# Patient Record
Sex: FEMALE | Race: WHITE | NOT HISPANIC OR LATINO | Employment: FULL TIME | URBAN - METROPOLITAN AREA
[De-identification: names, ages, dates, MRNs, and addresses within clinical notes are randomized per-mention and may not be internally consistent; named-entity substitution may affect disease eponyms.]

---

## 2022-05-03 ENCOUNTER — OFFICE VISIT (OUTPATIENT)
Dept: GASTROENTEROLOGY | Facility: CLINIC | Age: 37
End: 2022-05-03
Payer: COMMERCIAL

## 2022-05-03 ENCOUNTER — TELEPHONE (OUTPATIENT)
Dept: GASTROENTEROLOGY | Facility: CLINIC | Age: 37
End: 2022-05-03
Payer: COMMERCIAL

## 2022-05-03 ENCOUNTER — LAB VISIT (OUTPATIENT)
Dept: LAB | Facility: HOSPITAL | Age: 37
End: 2022-05-03
Attending: INTERNAL MEDICINE
Payer: COMMERCIAL

## 2022-05-03 VITALS
HEART RATE: 74 BPM | DIASTOLIC BLOOD PRESSURE: 82 MMHG | WEIGHT: 191.13 LBS | BODY MASS INDEX: 30 KG/M2 | SYSTOLIC BLOOD PRESSURE: 118 MMHG | HEIGHT: 67 IN

## 2022-05-03 DIAGNOSIS — K76.0 HEPATIC STEATOSIS: ICD-10-CM

## 2022-05-03 DIAGNOSIS — R10.2 PELVIC PAIN: ICD-10-CM

## 2022-05-03 LAB
ALBUMIN SERPL BCP-MCNC: 4.1 G/DL (ref 3.5–5.2)
ALP SERPL-CCNC: 53 U/L (ref 55–135)
ALT SERPL W/O P-5'-P-CCNC: 17 U/L (ref 10–44)
ANION GAP SERPL CALC-SCNC: 14 MMOL/L (ref 8–16)
AST SERPL-CCNC: 17 U/L (ref 10–40)
BILIRUB SERPL-MCNC: 0.4 MG/DL (ref 0.1–1)
BUN SERPL-MCNC: 14 MG/DL (ref 6–20)
CALCIUM SERPL-MCNC: 9.8 MG/DL (ref 8.7–10.5)
CHLORIDE SERPL-SCNC: 105 MMOL/L (ref 95–110)
CHOLEST SERPL-MCNC: 219 MG/DL (ref 120–199)
CHOLEST/HDLC SERPL: 4.6 {RATIO} (ref 2–5)
CO2 SERPL-SCNC: 19 MMOL/L (ref 23–29)
CREAT SERPL-MCNC: 0.6 MG/DL (ref 0.5–1.4)
EST. GFR  (AFRICAN AMERICAN): >60 ML/MIN/1.73 M^2
EST. GFR  (NON AFRICAN AMERICAN): >60 ML/MIN/1.73 M^2
GLUCOSE SERPL-MCNC: 79 MG/DL (ref 70–110)
HDLC SERPL-MCNC: 48 MG/DL (ref 40–75)
HDLC SERPL: 21.9 % (ref 20–50)
LDLC SERPL CALC-MCNC: 143.4 MG/DL (ref 63–159)
NONHDLC SERPL-MCNC: 171 MG/DL
POTASSIUM SERPL-SCNC: 4.2 MMOL/L (ref 3.5–5.1)
PROT SERPL-MCNC: 7.5 G/DL (ref 6–8.4)
SODIUM SERPL-SCNC: 138 MMOL/L (ref 136–145)
TRIGL SERPL-MCNC: 138 MG/DL (ref 30–150)

## 2022-05-03 PROCEDURE — 83036 HEMOGLOBIN GLYCOSYLATED A1C: CPT | Performed by: INTERNAL MEDICINE

## 2022-05-03 PROCEDURE — 36415 COLL VENOUS BLD VENIPUNCTURE: CPT | Mod: PO | Performed by: INTERNAL MEDICINE

## 2022-05-03 PROCEDURE — 99204 PR OFFICE/OUTPT VISIT, NEW, LEVL IV, 45-59 MIN: ICD-10-PCS | Mod: S$GLB,,, | Performed by: INTERNAL MEDICINE

## 2022-05-03 PROCEDURE — 80061 LIPID PANEL: CPT | Performed by: INTERNAL MEDICINE

## 2022-05-03 PROCEDURE — 99999 PR PBB SHADOW E&M-EST. PATIENT-LVL IV: ICD-10-PCS | Mod: PBBFAC,,, | Performed by: INTERNAL MEDICINE

## 2022-05-03 PROCEDURE — 80053 COMPREHEN METABOLIC PANEL: CPT | Performed by: INTERNAL MEDICINE

## 2022-05-03 PROCEDURE — 99999 PR PBB SHADOW E&M-EST. PATIENT-LVL IV: CPT | Mod: PBBFAC,,, | Performed by: INTERNAL MEDICINE

## 2022-05-03 PROCEDURE — 99204 OFFICE O/P NEW MOD 45 MIN: CPT | Mod: S$GLB,,, | Performed by: INTERNAL MEDICINE

## 2022-05-03 RX ORDER — PROPRANOLOL HYDROCHLORIDE 1 MG/ML
INJECTION INTRAVENOUS
COMMUNITY
End: 2022-05-18

## 2022-05-03 NOTE — ASSESSMENT & PLAN NOTE
-Patient counseled on weight loss   -CMP ordered   -HA1C and lipid panel ordered  -Requested US abdomen report

## 2022-05-03 NOTE — PROGRESS NOTES
Addendum:   Pelvic US: No acute findings   Abdominal US: Hepatic steatosis          Clinic Consult:  [facility] Consultation Note    Reason for Consult:  Diagnoses of Hepatic steatosis and Pelvic pain were pertinent to this visit.    PCP: Primary Doctor No   [address] / [place] [id number]    HPI:  This is a 37 y.o. female here for evaluation of abdominal pain.   Pain is LLQ and RLQ.   She has a history of ovarian cyst and retroperitoneal cyst. She had two surgeries for these cysts.   She is followed by ob-gyn.   She had an US abdomen/pelvis that revealed hepatic steatosis.  She was referred to GI for hepatic steatosis.     ROS:  Review of Systems   Genitourinary: Negative for pelvic pain.           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 6/21 CKC CIN2 (peripheral edges & deep margin neg)/-ECC; 1/22 nL/-HPV    Insulin resistance     no metformin in months prior to conception       Surgical History:  Past Surgical History:   Procedure Laterality Date    CERVICAL BIOPSY  W/ LOOP ELECTRODE EXCISION      CKC  06/25/2021    DILATION AND CURETTAGE OF UTERUS      EXPLORATORY LAPAROTOMY      x2; 7/18 during preg, ex lap w/removal of right retroperitoneal cyst, [facility] primary, Gen sx consulted intraop, path- 9cm mucinous cystadenoma. 3/21 ex lap by gen sx Dr. Diaz at [facility], gyn Dr. Pimentel consulted intraop-->LSO, path- 12.5cm mucinous cystadenoma    SALPINGOOPHORECTOMY  03/2021    ex-lap/LSO; gen sx primary Dr. Diaz at [facility], gyn Dr. Pimentel consulted intraop-->LSO, path- 12.5cm mucinous cystadenoma       Family History:   Family History   Problem Relation Age of  "Onset    Lung cancer Maternal Grandfather        Social History:   Social History     Tobacco Use    Smoking status: Never Smoker    Smokeless tobacco: Never Used   Substance Use Topics    Alcohol use: Yes    Drug use: Never       Allergies: Reviewed    Home Medications:   Medication List with Changes/Refills   Current Medications    AUROVELA FE 1-20, 28, 1 MG-20 MCG (21)/75 MG (7) PER TABLET    Take 1 tablet by mouth once daily.    CITALOPRAM HYDROBROMIDE (CITALOPRAM ORAL)    citalopram Take No date recorded No form recorded No frequency recorded No route recorded No set duration recorded No set duration amount recorded suspended No dosage strength recorded No dosage strength units of measure recorded    METRONIDAZOLE (FLAGYL) 500 MG TABLET    Take 1 tablet (500 mg total) by mouth every 12 (twelve) hours. for 7 days    PROPRANOLOL (INDERAL) 1 MG/ML INJECTION    propranolol Take No date recorded No form recorded No frequency recorded No route recorded No set duration recorded No set duration amount recorded suspended No dosage strength recorded No dosage strength units of measure recorded   Discontinued Medications    SERTRALINE (ZOLOFT) 50 MG TABLET    Take 50 mg by mouth once daily.         Physical Exam:  Vital Signs:  /82 (BP Location: Right arm, Patient Position: Sitting, BP Method: Medium (Manual))   Pulse 74   Ht 5' 7" (1.702 m)   Wt 86.7 kg (191 lb 2.2 oz)   LMP 04/17/2022   BMI 29.94 kg/m²   Body mass index is 29.94 kg/m².    Physical Exam  Constitutional:       Appearance: Normal appearance.   Eyes:      Conjunctiva/sclera: Conjunctivae normal.   Pulmonary:      Effort: Pulmonary effort is normal.   Abdominal:      General: There is no distension.      Palpations: Abdomen is soft.      Tenderness: There is no abdominal tenderness. There is no guarding.   Neurological:      Mental Status: She is alert.          Labs: Pertinent labs reviewed.    Assessment:  Problem List Items Addressed This " Visit        GI    Hepatic steatosis    Current Assessment & Plan     -Patient counseled on weight loss   -CMP ordered   -HA1C and lipid panel ordered  -Requested US abdomen report           Pelvic pain    Current Assessment & Plan     Continue to follow with ob-gyn                Hepatic steatosis  -     Ambulatory referral/consult to Gastroenterology  -     Hemoglobin A1C; Future; Expected date: 05/03/2022  -     Comprehensive Metabolic Panel; Future; Expected date: 05/03/2022  -     Lipid Panel; Future; Expected date: 05/03/2022    Pelvic pain                Follow up if symptoms worsen or fail to improve.    Order summary:  Orders Placed This Encounter   Procedures    Hemoglobin A1C    Comprehensive Metabolic Panel    Lipid Panel       Thank you so much for allowing me to participate in the care of Ceci BELINDA Owen MD  Gastroenterology and Hepatology  [facility]

## 2022-05-04 LAB
ESTIMATED AVG GLUCOSE: 105 MG/DL (ref 68–131)
HBA1C MFR BLD: 5.3 % (ref 4–5.6)